# Patient Record
Sex: MALE | Race: WHITE
[De-identification: names, ages, dates, MRNs, and addresses within clinical notes are randomized per-mention and may not be internally consistent; named-entity substitution may affect disease eponyms.]

---

## 2020-12-13 ENCOUNTER — HOSPITAL ENCOUNTER (EMERGENCY)
Dept: HOSPITAL 76 - ED | Age: 30
Discharge: HOME | End: 2020-12-13
Payer: COMMERCIAL

## 2020-12-13 VITALS — SYSTOLIC BLOOD PRESSURE: 126 MMHG | DIASTOLIC BLOOD PRESSURE: 72 MMHG
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
## 2020-12-13 DIAGNOSIS — S50.872A: ICD-10-CM

## 2020-12-13 DIAGNOSIS — S01.551A: Primary | ICD-10-CM

## 2020-12-13 DIAGNOSIS — S61.452A: ICD-10-CM
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
## 2020-12-13 PROCEDURE — 99282 EMERGENCY DEPT VISIT SF MDM: CPT

## 2020-12-13 PROCEDURE — 99284 EMERGENCY DEPT VISIT MOD MDM: CPT

## 2020-12-13 NOTE — ED PHYSICIAN DOCUMENTATION
History of Present Illness





- Stated complaint


Stated Complaint: DOG BITE





- Chief complaint


Chief Complaint: Wound





- History obtained from


History obtained from: Patient





- History of Present Illness


Timing: Today


Pain level max: 0


Pain level now: 0





- Additonal information


Additional information: 





30-year-old male presents to the emergency department after being bit by a dog 

today.  He states he has lacerations to the face, left hand and left forearm.  

Nothing makes it better or worse.  Tetanus up-to-date.  No allergies to 

medications.





Review of Systems


Constitutional: denies: Fever, Chills


Skin: denies: Rash


Musculoskeletal: denies: Neck pain, Back pain





PD PAST MEDICAL HISTORY





- Past Medical History


Past Medical History: No





- Past Surgical History


Past Surgical History: No





- Present Medications


Home Medications: 


                                Ambulatory Orders











 Medication  Instructions  Recorded  Confirmed


 


Amox/Clav 875/125 [Augmentin] 1 tab PO Q12H #20 tablet 12/13/20 














- Allergies


Allergies/Adverse Reactions: 


                                    Allergies











Allergy/AdvReac Type Severity Reaction Status Date / Time


 


No Known Drug Allergies Allergy   Verified 12/13/20 20:09














PD ED PE NORMAL





- Vitals


Vital signs reviewed: Yes





- General


General: Alert and oriented X 3, No acute distress





- HEENT


HEENT: Moist mucous membranes, Pharynx benign





- Neck


Neck: Supple, no meningeal sign





- Derm


Derm: Warm and dry





- Neuro


Neuro: Alert and oriented X 3





- Psych


Psych: Normal mood, Normal affect





- Free text exam


Free text exam: 





Small puncture wound to the palmar aspect of the left hand.  Not bleeding.  2 

small puncture wounds to the right upper lip. no bleeding. Superficial. Also has

 abrasions to the left forearm.





Results





- Vitals


Vitals: 


                               Vital Signs - 24 hr











  12/13/20 12/13/20





  19:07 20:10


 


Temperature 36.9 C 36.8 C


 


Heart Rate 98 88


 


Respiratory 16 16





Rate  


 


Blood Pressure 131/76 H 126/72


 


O2 Saturation 98 100








                                     Oxygen











O2 Source                      Room air

















PD MEDICAL DECISION MAKING





- ED course


Complexity details: considered differential, d/w patient


ED course: 





Patient status post a dog bite tonight.  Tetanus up-to-date.  No indication for 

rabies vaccination.  Will place on Augmentin.  Patient counseled regarding signs

 and symptoms for which I believe and urgent re-evaluation would be necessary. 

Patient with good understanding of and agreement to plan and is comfortable 

going home at this time





This document was made in part using voice recognition software. While efforts 

are made to proofread this document, sound alike and grammatical errors may 

occur.





Departure





- Departure


Disposition: 01 Home, Self Care


Clinical Impression: 


 Animal bite with open wound





Condition: Good


Instructions:  ED Bite Animal General


Follow-Up: 


your,doctor in 1 week for wound check [Other]


Prescriptions: 


Amox/Clav 875/125 [Augmentin] 1 tab PO Q12H #20 tablet


Comments: 


Your prescription was sent electronically to the [facility] today.  Pick it up 

tomorrow.  Return if you worsen.  Keep the wounds clean.  Return for redness, 

swelling or drainage from the wound.


Discharge Date/Time: 12/13/20 20:10